# Patient Record
Sex: FEMALE | URBAN - METROPOLITAN AREA
[De-identification: names, ages, dates, MRNs, and addresses within clinical notes are randomized per-mention and may not be internally consistent; named-entity substitution may affect disease eponyms.]

---

## 2023-08-24 ENCOUNTER — WEB ENCOUNTER (OUTPATIENT)
Dept: URBAN - METROPOLITAN AREA CLINIC 80 | Facility: CLINIC | Age: 18
End: 2023-08-24

## 2023-08-24 ENCOUNTER — WEB ENCOUNTER (OUTPATIENT)
Dept: URBAN - METROPOLITAN AREA CLINIC 3 | Facility: CLINIC | Age: 18
End: 2023-08-24

## 2023-08-25 ENCOUNTER — OFFICE VISIT (OUTPATIENT)
Dept: URBAN - METROPOLITAN AREA CLINIC 80 | Facility: CLINIC | Age: 18
End: 2023-08-25
Payer: COMMERCIAL

## 2023-08-25 VITALS
DIASTOLIC BLOOD PRESSURE: 73 MMHG | WEIGHT: 162 LBS | BODY MASS INDEX: 23.99 KG/M2 | SYSTOLIC BLOOD PRESSURE: 106 MMHG | HEIGHT: 69 IN | HEART RATE: 67 BPM | TEMPERATURE: 98.1 F

## 2023-08-25 DIAGNOSIS — R19.7 ACUTE DIARRHEA: ICD-10-CM

## 2023-08-25 PROBLEM — 10743008: Status: ACTIVE | Noted: 2023-08-25

## 2023-08-25 PROCEDURE — 99203 OFFICE O/P NEW LOW 30 MIN: CPT | Performed by: STUDENT IN AN ORGANIZED HEALTH CARE EDUCATION/TRAINING PROGRAM

## 2023-08-25 RX ORDER — DICYCLOMINE HYDROCHLORIDE 10 MG/1
1 CAPSULE 30 MINUTES BEFORE EATING CAPSULE ORAL THREE TIMES A DAY
Qty: 180 | Refills: 3 | OUTPATIENT
Start: 2023-08-25 | End: 2023-12-22

## 2023-08-25 RX ORDER — ESCITALOPRAM OXALATE 5 MG/1
1 TABLET TABLET, FILM COATED ORAL ONCE A DAY
Status: ACTIVE | COMMUNITY

## 2023-08-25 NOTE — HPI-TODAY'S VISIT:
For the past two years, Ms. Brown reports frequent diarrhea and abdominal pain. She feels that she eats healthier in college and has worse symptoms at home associated with increased dairy and fried foods. The pain occurrs after eating and seems to resolve after defecation. She has 1-2 stools per day at [place] stool scale 5-6. On a bad day, she may have 7 bowel movements. She denies a rectal bleeding or unintentional weight loss. She denies a family history of IBD, celiac diseas, or colon cancer. She did not notice any change in bowel habits with initiation of Lexapro.  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 In 3 days, she leaves for a study abroad program.

## 2023-08-28 LAB
ABSOLUTE BASOPHILS: 49
ABSOLUTE EOSINOPHILS: 128
ABSOLUTE LYMPHOCYTES: 1763
ABSOLUTE MONOCYTES: 580
ABSOLUTE NEUTROPHILS: 3581
BASOPHILS: 0.8
C-REACTIVE PROTEIN, QUANT: 0.5
EOSINOPHILS: 2.1
HEMATOCRIT: 31.6
HEMOGLOBIN: 9.8
IMMUNOGLOBULIN A, QN, SERUM: 121
LYMPHOCYTES: 28.9
MCH: 23
MCHC: 31
MCV: 74.2
MONOCYTES: 9.5
MPV: 9
NEUTROPHILS: 58.7
PLATELET COUNT: 418
RDW: 17
RED BLOOD CELL COUNT: 4.26
T-TRANSGLUTAMINASE (TTG) IGA: <1
WHITE BLOOD CELL COUNT: 6.1

## 2023-09-02 LAB — CALPROTECTIN, FECAL: 6

## 2023-12-20 ENCOUNTER — CLAIMS CREATED FROM THE CLAIM WINDOW (OUTPATIENT)
Dept: URBAN - METROPOLITAN AREA CLINIC 80 | Facility: CLINIC | Age: 18
End: 2023-12-20
Payer: COMMERCIAL

## 2023-12-20 ENCOUNTER — OFFICE VISIT (OUTPATIENT)
Dept: URBAN - METROPOLITAN AREA CLINIC 80 | Facility: CLINIC | Age: 18
End: 2023-12-20

## 2023-12-20 ENCOUNTER — DASHBOARD ENCOUNTERS (OUTPATIENT)
Age: 18
End: 2023-12-20

## 2023-12-20 VITALS
BODY MASS INDEX: 23.85 KG/M2 | HEIGHT: 69 IN | DIASTOLIC BLOOD PRESSURE: 70 MMHG | SYSTOLIC BLOOD PRESSURE: 115 MMHG | HEART RATE: 88 BPM | TEMPERATURE: 97.9 F | WEIGHT: 161 LBS

## 2023-12-20 DIAGNOSIS — K58.9 IRRITABLE BOWEL SYNDROME WITHOUT DIARRHEA: ICD-10-CM

## 2023-12-20 DIAGNOSIS — R11.0 NAUSEA: ICD-10-CM

## 2023-12-20 DIAGNOSIS — D64.9 ANEMIA, UNSPECIFIED TYPE: ICD-10-CM

## 2023-12-20 DIAGNOSIS — K58.8 OTHER IRRITABLE BOWEL SYNDROME: ICD-10-CM

## 2023-12-20 DIAGNOSIS — D64.9 ABSOLUTE ANEMIA: ICD-10-CM

## 2023-12-20 PROCEDURE — 99213 OFFICE O/P EST LOW 20 MIN: CPT | Performed by: PHYSICIAN ASSISTANT

## 2023-12-20 RX ORDER — DICYCLOMINE HYDROCHLORIDE 10 MG/1
1 CAPSULE 30 MINUTES BEFORE EATING CAPSULE ORAL THREE TIMES A DAY
Qty: 180 | Refills: 3 | Status: ACTIVE | COMMUNITY
Start: 2023-08-25 | End: 2023-12-22

## 2023-12-20 RX ORDER — ESCITALOPRAM OXALATE 5 MG/1
1 TABLET TABLET, FILM COATED ORAL ONCE A DAY
Status: ACTIVE | COMMUNITY

## 2023-12-20 NOTE — HPI-TODAY'S VISIT:
Pt just got back from studying abroad in [place] her diarrhea was pretty bad over there she gets nauseated after eating as well now she is doing some better - 1-3 BM a day - at the moment they are more formed - over the past 3 weeks nausea will last an hour or so after eating - resolves on its own no family hx GB disease labs were normal other than anemia - she does not have heavy menst cycles - has never been told she was anemic in the past no weakness or dizziness - does run a lot fecal calprotectin was normal in August she can get abd pain when she runs - if after eating or later in the day she gets abd pain within 20 min of a run - if runs in am no issues - the cramping is relieved if she has a BM  did not find Dicyclomine to help her from cramping when she goes running 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

## 2023-12-21 LAB
ABSOLUTE BASOPHILS: 53
ABSOLUTE EOSINOPHILS: 158
ABSOLUTE LYMPHOCYTES: 2085
ABSOLUTE MONOCYTES: 743
ABSOLUTE NEUTROPHILS: 4463
BASOPHILS: 0.7
EOSINOPHILS: 2.1
FERRITIN, SERUM: 13
HEMATOCRIT: 37.2
HEMOGLOBIN: 12.2
IRON BIND.CAP.(TIBC): 395
IRON SATURATION: 18
IRON: 70
LYMPHOCYTES: 27.8
MCH: 27.5
MCHC: 32.8
MCV: 83.8
MONOCYTES: 9.9
MPV: 9.9
NEUTROPHILS: 59.5
PLATELET COUNT: 410
RDW: 15
RED BLOOD CELL COUNT: 4.44
WHITE BLOOD CELL COUNT: 7.5

## 2023-12-22 ENCOUNTER — TELEPHONE ENCOUNTER (OUTPATIENT)
Dept: URBAN - METROPOLITAN AREA CLINIC 79 | Facility: CLINIC | Age: 18
End: 2023-12-22

## 2025-06-18 ENCOUNTER — OFFICE VISIT (OUTPATIENT)
Dept: URBAN - METROPOLITAN AREA CLINIC 80 | Facility: CLINIC | Age: 20
End: 2025-06-18
Payer: COMMERCIAL

## 2025-06-18 ENCOUNTER — OFFICE VISIT (OUTPATIENT)
Dept: URBAN - METROPOLITAN AREA CLINIC 80 | Facility: CLINIC | Age: 20
End: 2025-06-18

## 2025-06-18 ENCOUNTER — LAB OUTSIDE AN ENCOUNTER (OUTPATIENT)
Dept: URBAN - METROPOLITAN AREA CLINIC 80 | Facility: CLINIC | Age: 20
End: 2025-06-18

## 2025-06-18 DIAGNOSIS — R12 HEARTBURN: ICD-10-CM

## 2025-06-18 DIAGNOSIS — R19.4 CHANGE IN BOWEL HABIT: ICD-10-CM

## 2025-06-18 DIAGNOSIS — R11.0 NAUSEA: ICD-10-CM

## 2025-06-18 DIAGNOSIS — D50.9 IRON DEFICIENCY ANEMIA, UNSPECIFIED IRON DEFICIENCY ANEMIA TYPE: ICD-10-CM

## 2025-06-18 PROBLEM — 87522002: Status: ACTIVE | Noted: 2025-06-18

## 2025-06-18 PROCEDURE — 99214 OFFICE O/P EST MOD 30 MIN: CPT | Performed by: PHYSICIAN ASSISTANT

## 2025-06-18 RX ORDER — PANTOPRAZOLE SODIUM 40 MG/1
1 TABLET 1/2 TO 1 HOUR BEFORE MORNING MEAL TABLET, DELAYED RELEASE ORAL ONCE A DAY
Qty: 90 TABLET | Refills: 3 | OUTPATIENT
Start: 2025-06-18

## 2025-06-18 RX ORDER — ESCITALOPRAM OXALATE 5 MG/1
1 TABLET TABLET, FILM COATED ORAL ONCE A DAY
Status: DISCONTINUED | COMMUNITY

## 2025-06-18 NOTE — HPI-TODAY'S VISIT:
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 she is still getting stomach aches every week  gets nauesa and diarrhea several times a day increased abd bloating over winter time it got to a bad point - was anemic again and her ferritin had dropped to 5.4 - was having numbness in hands, muscle cramps, blurred vision, cold all the time - she is a triathlete and was hard to get workouts done she has been on iron for about 6 weeks - muscle cramps, blurred vision, etc is better now since on the iron she will have some episodes of constipation  stool is dark on the iron  - no brbpr  abd pain is all over her abdomen - usually not better with bm over the past few months her stomach will hurt in am and that gets better with a BM she does not vomit eating can make the pain and nausea worse also getting increased acid reflux at times - a year ago she would take Gaviscon at times

## 2025-07-02 ENCOUNTER — LAB OUTSIDE AN ENCOUNTER (OUTPATIENT)
Dept: URBAN - METROPOLITAN AREA CLINIC 80 | Facility: CLINIC | Age: 20
End: 2025-07-02

## 2025-07-05 LAB
ADENOVIRUS F 40/41: NOT DETECTED
CALPROTECTIN, STOOL - QDX: (no result)
CAMPYLOBACTER: NOT DETECTED
CLOSTRIDIUM DIFFICILE: NOT DETECTED
ENTAMOEBA HISTOLYTICA: NOT DETECTED
ENTEROAGGREGATIVE E.COLI: NOT DETECTED
ENTEROTOXIGENIC E.COLI: NOT DETECTED
ESCHERICHIA COLI O157: NOT DETECTED
GIARDIA LAMBLIA: NOT DETECTED
NOROVIRUS GI/GII: NOT DETECTED
PANCREATICELASTASE ELISA, STOOL: (no result)
ROTAVIRUS A: NOT DETECTED
SALMONELLA SPP.: NOT DETECTED
SHIGA-LIKE TOXIN PRODUCING E.COLI: NOT DETECTED
SHIGELLA SPP. / ENTEROINVASIVE E.COLI: NOT DETECTED
VIBRIO PARAHAEMOLYTICUS: NOT DETECTED
VIBRIO SPP.: NOT DETECTED
YERSINIA ENTEROCOLITICA: NOT DETECTED

## 2025-07-10 ENCOUNTER — OFFICE VISIT (OUTPATIENT)
Dept: URBAN - METROPOLITAN AREA CLINIC 80 | Facility: CLINIC | Age: 20
End: 2025-07-10

## 2025-07-10 ENCOUNTER — CLAIMS CREATED FROM THE CLAIM WINDOW (OUTPATIENT)
Dept: URBAN - METROPOLITAN AREA SURGERY CENTER 19 | Facility: SURGERY CENTER | Age: 20
End: 2025-07-10
Payer: COMMERCIAL

## 2025-07-10 ENCOUNTER — TELEPHONE ENCOUNTER (OUTPATIENT)
Dept: URBAN - METROPOLITAN AREA CLINIC 80 | Facility: CLINIC | Age: 20
End: 2025-07-10

## 2025-07-10 ENCOUNTER — CLAIMS CREATED FROM THE CLAIM WINDOW (OUTPATIENT)
Dept: URBAN - METROPOLITAN AREA CLINIC 4 | Facility: CLINIC | Age: 20
End: 2025-07-10
Payer: COMMERCIAL

## 2025-07-10 DIAGNOSIS — R19.4 CHANGE IN BOWEL HABITS: ICD-10-CM

## 2025-07-10 DIAGNOSIS — D50.9 ANEMIA, IRON DEFICIENCY: ICD-10-CM

## 2025-07-10 DIAGNOSIS — R11.0 NAUSEA: ICD-10-CM

## 2025-07-10 DIAGNOSIS — K31.89 OTHER DISEASES OF STOMACH AND DUODENUM: ICD-10-CM

## 2025-07-10 DIAGNOSIS — K63.89 OTHER SPECIFIED DISEASES OF INTESTINE: ICD-10-CM

## 2025-07-10 DIAGNOSIS — R19.7 DIARRHEA, UNSPECIFIED TYPE: ICD-10-CM

## 2025-07-10 DIAGNOSIS — K21.9 GASTRIC REFLUX: ICD-10-CM

## 2025-07-10 DIAGNOSIS — R14.0 ABDOMINAL BLOATING: ICD-10-CM

## 2025-07-10 PROCEDURE — 00813 ANES UPR LWR GI NDSC PX: CPT | Performed by: NURSE ANESTHETIST, CERTIFIED REGISTERED

## 2025-07-10 PROCEDURE — 88312 SPECIAL STAINS GROUP 1: CPT | Performed by: PATHOLOGY

## 2025-07-10 PROCEDURE — 45380 COLONOSCOPY AND BIOPSY: CPT | Performed by: STUDENT IN AN ORGANIZED HEALTH CARE EDUCATION/TRAINING PROGRAM

## 2025-07-10 PROCEDURE — 43239 EGD BIOPSY SINGLE/MULTIPLE: CPT | Performed by: STUDENT IN AN ORGANIZED HEALTH CARE EDUCATION/TRAINING PROGRAM

## 2025-07-10 PROCEDURE — 88305 TISSUE EXAM BY PATHOLOGIST: CPT | Performed by: PATHOLOGY

## 2025-07-10 PROCEDURE — 88342 IMHCHEM/IMCYTCHM 1ST ANTB: CPT | Performed by: PATHOLOGY

## 2025-07-10 PROCEDURE — 88313 SPECIAL STAINS GROUP 2: CPT | Performed by: PATHOLOGY

## 2025-07-10 RX ORDER — PANTOPRAZOLE SODIUM 40 MG/1
1 TABLET 1/2 TO 1 HOUR BEFORE MORNING MEAL TABLET, DELAYED RELEASE ORAL ONCE A DAY
Qty: 90 TABLET | Refills: 3 | Status: ACTIVE | COMMUNITY
Start: 2025-06-18

## 2025-07-24 ENCOUNTER — OFFICE VISIT (OUTPATIENT)
Dept: URBAN - METROPOLITAN AREA CLINIC 80 | Facility: CLINIC | Age: 20
End: 2025-07-24

## 2025-07-24 RX ORDER — PANTOPRAZOLE SODIUM 40 MG/1
1 TABLET 1/2 TO 1 HOUR BEFORE MORNING MEAL TABLET, DELAYED RELEASE ORAL ONCE A DAY
Qty: 90 TABLET | Refills: 3 | Status: ACTIVE | COMMUNITY
Start: 2025-06-18

## 2025-07-24 NOTE — HPI-TODAY'S VISIT:
Ms Brown is a 20yoF who presents for follow-up of abdominal pain, nausea, bloating, heartburn, and severe NEFTALY - previously causing numbness in hands, muscle cramps, blurred vision, and feeling cold. On our initial visit Hgb 9.8 with MCV 74.2 (8/2023). A few months later iron stores still very defficient but with repletion, Hgb had reached 12.2 with oral iron. CRP and celiac serologies normal. Stool studies normal. EGD and colonoscopy last week with biopsies also unremarkable.  Two months, iron 31 and ferritin 5.4 and iron sat 6%. Denies heavy periods. On iron diarrhea slowed down. Previously 3-7 BM per day but on iron 1-2. Believes nausea may have improved with pantoprazole.

## 2025-08-14 ENCOUNTER — OFFICE VISIT (OUTPATIENT)
Dept: URBAN - METROPOLITAN AREA CLINIC 79 | Facility: CLINIC | Age: 20
End: 2025-08-14